# Patient Record
Sex: FEMALE | Race: WHITE | NOT HISPANIC OR LATINO
[De-identification: names, ages, dates, MRNs, and addresses within clinical notes are randomized per-mention and may not be internally consistent; named-entity substitution may affect disease eponyms.]

---

## 2018-10-09 ENCOUNTER — RX RENEWAL (OUTPATIENT)
Age: 58
End: 2018-10-09

## 2018-11-09 ENCOUNTER — APPOINTMENT (OUTPATIENT)
Dept: ENDOCRINOLOGY | Facility: CLINIC | Age: 58
End: 2018-11-09
Payer: COMMERCIAL

## 2018-11-09 ENCOUNTER — RECORD ABSTRACTING (OUTPATIENT)
Age: 58
End: 2018-11-09

## 2018-11-09 VITALS
WEIGHT: 144 LBS | DIASTOLIC BLOOD PRESSURE: 70 MMHG | HEIGHT: 61.5 IN | BODY MASS INDEX: 26.84 KG/M2 | SYSTOLIC BLOOD PRESSURE: 110 MMHG | HEART RATE: 76 BPM

## 2018-11-09 DIAGNOSIS — F17.200 NICOTINE DEPENDENCE, UNSPECIFIED, UNCOMPLICATED: ICD-10-CM

## 2018-11-09 DIAGNOSIS — Z86.39 PERSONAL HISTORY OF OTHER ENDOCRINE, NUTRITIONAL AND METABOLIC DISEASE: ICD-10-CM

## 2018-11-09 DIAGNOSIS — Z78.9 OTHER SPECIFIED HEALTH STATUS: ICD-10-CM

## 2018-11-09 DIAGNOSIS — E78.5 HYPERLIPIDEMIA, UNSPECIFIED: ICD-10-CM

## 2018-11-09 DIAGNOSIS — E53.8 DEFICIENCY OF OTHER SPECIFIED B GROUP VITAMINS: ICD-10-CM

## 2018-11-09 PROCEDURE — 99213 OFFICE O/P EST LOW 20 MIN: CPT

## 2018-11-14 ENCOUNTER — RX CHANGE (OUTPATIENT)
Age: 58
End: 2018-11-14

## 2019-01-04 ENCOUNTER — MEDICATION RENEWAL (OUTPATIENT)
Age: 59
End: 2019-01-04

## 2019-05-17 ENCOUNTER — APPOINTMENT (OUTPATIENT)
Dept: ENDOCRINOLOGY | Facility: CLINIC | Age: 59
End: 2019-05-17
Payer: COMMERCIAL

## 2019-05-17 VITALS
DIASTOLIC BLOOD PRESSURE: 68 MMHG | HEART RATE: 73 BPM | WEIGHT: 141 LBS | SYSTOLIC BLOOD PRESSURE: 118 MMHG | BODY MASS INDEX: 26.28 KG/M2 | HEIGHT: 61.5 IN

## 2019-05-17 PROCEDURE — 99213 OFFICE O/P EST LOW 20 MIN: CPT

## 2019-05-20 NOTE — HISTORY OF PRESENT ILLNESS
[FreeTextEntry1] : Pt here for follow up Hypothryiodism PreD HTN high chol\par has been doing wellon med\par feels well\par  knows needs to get back to watching diet

## 2019-05-20 NOTE — PHYSICAL EXAM
[Alert] : alert [Well Nourished] : well nourished [No Acute Distress] : no acute distress [EOMI] : extra ocular movement intact [Normal Sclera/Conjunctiva] : normal sclera/conjunctiva [No Proptosis] : no proptosis [Well Developed] : well developed [No Thyroid Nodules] : there were no palpable thyroid nodules [Normal Oropharynx] : the oropharynx was normal [No Accessory Muscle Use] : no accessory muscle use [No Respiratory Distress] : no respiratory distress [Clear to Auscultation] : lungs were clear to auscultation bilaterally [Regular Rhythm] : with a regular rhythm [Normal Rate] : heart rate was normal  [Normal S1, S2] : normal S1 and S2 [Post Cervical Nodes] : posterior cervical nodes [No Edema] : there was no peripheral edema [Axillary Nodes] : axillary nodes [No Spinal Tenderness] : no spinal tenderness [Normal] : normal and non tender [Anterior Cervical Nodes] : anterior cervical nodes [Spine Straight] : spine straight [No Stigmata of Cushings Syndrome] : no stigmata of cushings syndrome [No Rash] : no rash [Normal Gait] : normal gait [Normal Strength/Tone] : muscle strength and tone were normal [Normal Reflexes] : deep tendon reflexes were 2+ and symmetric [No Tremors] : no tremors [Acanthosis Nigricans] : no acanthosis nigricans [Oriented x3] : oriented to person, place, and time [de-identified] : mild enlarged thryoid

## 2019-05-20 NOTE — ASSESSMENT
[FreeTextEntry1] : Hypothryoid will keep at Synthroid generic for now TFT in Nov were ok \par if off again willswitch to generic \par Low B12 cont otc \par low Vit Do cont OTC \par IFG - watch diet \par high chol- cont  zocor 20 mg qhs \par \par

## 2019-06-11 ENCOUNTER — RX RENEWAL (OUTPATIENT)
Age: 59
End: 2019-06-11

## 2019-07-09 ENCOUNTER — RX RENEWAL (OUTPATIENT)
Age: 59
End: 2019-07-09

## 2019-11-18 ENCOUNTER — APPOINTMENT (OUTPATIENT)
Dept: ENDOCRINOLOGY | Facility: CLINIC | Age: 59
End: 2019-11-18

## 2019-12-11 ENCOUNTER — RX RENEWAL (OUTPATIENT)
Age: 59
End: 2019-12-11

## 2020-01-03 ENCOUNTER — APPOINTMENT (OUTPATIENT)
Dept: ENDOCRINOLOGY | Facility: CLINIC | Age: 60
End: 2020-01-03
Payer: COMMERCIAL

## 2020-01-03 VITALS
HEIGHT: 61.5 IN | SYSTOLIC BLOOD PRESSURE: 110 MMHG | WEIGHT: 138 LBS | BODY MASS INDEX: 25.72 KG/M2 | OXYGEN SATURATION: 98 % | DIASTOLIC BLOOD PRESSURE: 70 MMHG | HEART RATE: 84 BPM

## 2020-01-03 PROCEDURE — 99213 OFFICE O/P EST LOW 20 MIN: CPT

## 2020-01-03 NOTE — PHYSICAL EXAM
[No Acute Distress] : no acute distress [Alert] : alert [Well Nourished] : well nourished [Well Developed] : well developed [Normal Sclera/Conjunctiva] : normal sclera/conjunctiva [EOMI] : extra ocular movement intact [No Thyroid Nodules] : there were no palpable thyroid nodules [No Respiratory Distress] : no respiratory distress [No Accessory Muscle Use] : no accessory muscle use [Clear to Auscultation] : lungs were clear to auscultation bilaterally [Normal Rate] : heart rate was normal  [Normal S1, S2] : normal S1 and S2 [No Edema] : there was no peripheral edema [Regular Rhythm] : with a regular rhythm [Post Cervical Nodes] : posterior cervical nodes [Anterior Cervical Nodes] : anterior cervical nodes [Normal] : normal and non tender [Spine Straight] : spine straight [No Stigmata of Cushings Syndrome] : no stigmata of cushings syndrome [Normal Gait] : normal gait [Normal Strength/Tone] : muscle strength and tone were normal [No Rash] : no rash [No Tremors] : no tremors [Normal Reflexes] : deep tendon reflexes were 2+ and symmetric [Oriented x3] : oriented to person, place, and time [Acanthosis Nigricans] : no acanthosis nigricans [de-identified] :  + red throat   [de-identified] : mild enlarged thryoid

## 2020-01-03 NOTE — HISTORY OF PRESENT ILLNESS
[FreeTextEntry1] : Pt here for follow up Hypothryiodism PreD HTN high chol\par \par woke up today with sore throat and nasal congestion \par \par oherwise has been doing ok

## 2020-01-03 NOTE — ASSESSMENT
[FreeTextEntry1] : Hypothryoid will keep at Synthroid generic-   but take 1 tab mon through sat  1/2 tab on sunday \par \par Low B12 cont otc \par low Vit Do cont OTC \par IFG - watch diet \par high chol- cont  zocor 20 mg qhs  low HSL- try more omega 3 foods - or can try omega 3 OTC 1 capsule per day  available OTC \par \par red throat- see PMD or walk in for strep test

## 2020-06-12 ENCOUNTER — RX RENEWAL (OUTPATIENT)
Age: 60
End: 2020-06-12

## 2020-07-03 ENCOUNTER — RX RENEWAL (OUTPATIENT)
Age: 60
End: 2020-07-03

## 2020-07-15 ENCOUNTER — APPOINTMENT (OUTPATIENT)
Dept: ENDOCRINOLOGY | Facility: CLINIC | Age: 60
End: 2020-07-15
Payer: COMMERCIAL

## 2020-07-15 VITALS
DIASTOLIC BLOOD PRESSURE: 60 MMHG | HEIGHT: 61 IN | SYSTOLIC BLOOD PRESSURE: 112 MMHG | BODY MASS INDEX: 26.06 KG/M2 | WEIGHT: 138 LBS

## 2020-07-15 VITALS — TEMPERATURE: 98.2 F

## 2020-07-15 DIAGNOSIS — E04.2 NONTOXIC MULTINODULAR GOITER: ICD-10-CM

## 2020-07-15 DIAGNOSIS — R73.01 IMPAIRED FASTING GLUCOSE: ICD-10-CM

## 2020-07-15 DIAGNOSIS — E06.3 AUTOIMMUNE THYROIDITIS: ICD-10-CM

## 2020-07-15 DIAGNOSIS — E03.9 HYPOTHYROIDISM, UNSPECIFIED: ICD-10-CM

## 2020-07-15 PROCEDURE — 99213 OFFICE O/P EST LOW 20 MIN: CPT

## 2020-07-15 RX ORDER — LEVOTHYROXINE SODIUM 0.1 MG/1
100 TABLET ORAL DAILY
Qty: 30 | Refills: 1 | Status: DISCONTINUED | OUTPATIENT
Start: 2018-11-27 | End: 2020-07-15

## 2020-07-19 PROBLEM — R73.01 IMPAIRED FASTING GLUCOSE: Status: ACTIVE | Noted: 2018-11-09

## 2020-07-19 PROBLEM — E06.3 AUTOIMMUNE THYROIDITIS: Status: ACTIVE | Noted: 2018-11-09

## 2020-07-19 PROBLEM — E03.9 HYPOTHYROID: Status: ACTIVE | Noted: 2018-10-02

## 2020-07-19 PROBLEM — E04.2 NONTOXIC MULTINODULAR GOITER: Status: ACTIVE | Noted: 2018-11-09

## 2020-07-19 NOTE — ASSESSMENT
[FreeTextEntry1] : Hypothryoid will keep at Synthroid generic-   but take 1 tab mon through sat  skip sunday \par check labs in 6 weeks \par  hematuria  Abn l UA - to see urology \par Low B12 cont otc \par low Vit Do cont OTC \par IFG - watch diet \par high chol- cont  zocor 20 mg qhs  low HDL- try more omega 3 foods - or can try omega 3 OTC 1 capsule per day  available OTC \par

## 2020-07-19 NOTE — HISTORY OF PRESENT ILLNESS
[FreeTextEntry1] : Pt here for follow up Hypothryiodism PreD HTN high chol\par \par has been on LT4 0.100  mg 6/7 days past 2 weeks\par   made appt to see Urology  \par voerall feeling well

## 2020-07-19 NOTE — PHYSICAL EXAM
[Alert] : alert [No Acute Distress] : no acute distress [Well Nourished] : well nourished [Normal Sclera/Conjunctiva] : normal sclera/conjunctiva [Well Developed] : well developed [EOMI] : extra ocular movement intact [No Proptosis] : no proptosis [Thyroid Not Enlarged] : the thyroid was not enlarged [No Thyroid Nodules] : no palpable thyroid nodules [No Respiratory Distress] : no respiratory distress [No Accessory Muscle Use] : no accessory muscle use [Normal S1, S2] : normal S1 and S2 [Normal Rate] : heart rate was normal [Clear to Auscultation] : lungs were clear to auscultation bilaterally [No Edema] : no peripheral edema [Pedal Pulses Normal] : the pedal pulses are present [Regular Rhythm] : with a regular rhythm [Normal Posterior Cervical Nodes] : no posterior cervical lymphadenopathy [No Spinal Tenderness] : no spinal tenderness [Normal Anterior Cervical Nodes] : no anterior cervical lymphadenopathy [No Stigmata of Cushings Syndrome] : no stigmata of Cushings Syndrome [Spine Straight] : spine straight [Normal Gait] : normal gait [Normal Strength/Tone] : muscle strength and tone were normal [Normal Reflexes] : deep tendon reflexes were 2+ and symmetric [No Rash] : no rash [Acanthosis Nigricans] : no acanthosis nigricans [No Tremors] : no tremors [Oriented x3] : oriented to person, place, and time

## 2020-11-18 ENCOUNTER — LABORATORY RESULT (OUTPATIENT)
Age: 60
End: 2020-11-18

## 2020-11-18 ENCOUNTER — APPOINTMENT (OUTPATIENT)
Dept: RHEUMATOLOGY | Facility: CLINIC | Age: 60
End: 2020-11-18
Payer: COMMERCIAL

## 2020-11-18 VITALS
BODY MASS INDEX: 25.86 KG/M2 | WEIGHT: 137 LBS | HEART RATE: 80 BPM | DIASTOLIC BLOOD PRESSURE: 76 MMHG | TEMPERATURE: 97.9 F | HEIGHT: 61 IN | OXYGEN SATURATION: 95 % | RESPIRATION RATE: 17 BRPM | SYSTOLIC BLOOD PRESSURE: 102 MMHG

## 2020-11-18 DIAGNOSIS — Z82.62 FAMILY HISTORY OF OSTEOPOROSIS: ICD-10-CM

## 2020-11-18 DIAGNOSIS — F17.290 NICOTINE DEPENDENCE, OTHER TOBACCO PRODUCT, UNCOMPLICATED: ICD-10-CM

## 2020-11-18 PROCEDURE — 99203 OFFICE O/P NEW LOW 30 MIN: CPT

## 2020-11-18 RX ORDER — CHOLECALCIFEROL (VITAMIN D3) 25 MCG
25 MCG TABLET ORAL
Qty: 100 | Refills: 0 | Status: DISCONTINUED | COMMUNITY
Start: 2018-11-09 | End: 2020-11-18

## 2020-11-18 RX ORDER — VITAMIN K2 90 MCG
125 MCG CAPSULE ORAL
Refills: 0 | Status: ACTIVE | COMMUNITY

## 2020-11-18 RX ORDER — CALCIUM CARBONATE/VITAMIN D3 500 MG-2.5
500-100 TABLET,CHEWABLE ORAL
Qty: 90 | Refills: 0 | Status: DISCONTINUED | COMMUNITY
Start: 2018-11-09 | End: 2020-11-18

## 2020-11-18 RX ORDER — CALCIUM CARBONATE/VITAMIN D3 600 MG-20
TABLET ORAL
Refills: 0 | Status: ACTIVE | COMMUNITY

## 2020-11-18 NOTE — PHYSICAL EXAM
[General Appearance - Alert] : alert [General Appearance - In No Acute Distress] : in no acute distress [General Appearance - Well Nourished] : well nourished [General Appearance - Well Developed] : well developed [Sclera] : the sclera and conjunctiva were normal [PERRL With Normal Accommodation] : pupils were equal in size, round, and reactive to light [Extraocular Movements] : extraocular movements were intact [Outer Ear] : the ears and nose were normal in appearance [Examination Of The Oral Cavity] : the lips and gums were normal [Oropharynx] : the oropharynx was normal [Neck Appearance] : the appearance of the neck was normal [Jugular Venous Distention Increased] : there was no jugular-venous distention [Auscultation Breath Sounds / Voice Sounds] : lungs were clear to auscultation bilaterally [Heart Rate And Rhythm] : heart rate was normal and rhythm regular [Heart Sounds] : normal S1 and S2 [Heart Sounds Gallop] : no gallops [Murmurs] : no murmurs [Heart Sounds Pericardial Friction Rub] : no pericardial rub [No CVA Tenderness] : no ~M costovertebral angle tenderness [No Spinal Tenderness] : no spinal tenderness [Abnormal Walk] : normal gait [Nail Clubbing] : no clubbing  or cyanosis of the fingernails [Involuntary Movements] : no involuntary movements were seen [Musculoskeletal - Swelling] : no joint swelling seen [Motor Tone] : muscle strength and tone were normal [] : no rash [Skin Lesions] : no skin lesions [Sensation] : the sensory exam was normal to light touch and pinprick [Motor Exam] : the motor exam was normal [No Focal Deficits] : no focal deficits [Oriented To Time, Place, And Person] : oriented to person, place, and time [Impaired Insight] : insight and judgment were intact [Affect] : the affect was normal [Mood] : the mood was normal

## 2020-11-18 NOTE — ASSESSMENT
[FreeTextEntry1] : OP, postmenopausal\par \par - labs as below\par - review DEXA- done at [facility]\par - calcium and vitamin d supplementation. \par - regular exercise: aerobic and resistance\par - fall prevention\par - smoking cessation\par - limitation of alcohol and caffeine intake\par - will plan to start therapy with oral bisphosphate after reviewing results\par Discussed with the patient risks and benefits of bisphosphonates including, but not limited to, loss of appetite atypical bone fractures, gastritis, hypocalcemia, esophagitis, gastrointestinal strictures, bone and muscle pain, TM/jaw osteonecrosis (bone necrosis)\par \par Discussed treatment plan with the patient. The patient was given the opportunity to ask questions and all questions were answered to their satisfaction.

## 2020-11-18 NOTE — HISTORY OF PRESENT ILLNESS
[FreeTextEntry1] : 60 year old female with PMH as listed below presents today for an initial evaluation for OP\par \par Last DEXA 10/2020 with OP ( Not available to review today). Currently on calcium/vitamin d supplementation.  denies previous treatment. denies previous fx. denies parental hip fractures. denies estrogen use. denies steroid use. denies GERD. denies hx of inflammatory arthritides. 1 recent fall last week- tripped over box. No fx. denies any upcoming or planned jaw surgery ,extractions, etc. +cigarette. +occasional alcohol use.

## 2020-12-02 ENCOUNTER — APPOINTMENT (OUTPATIENT)
Dept: RHEUMATOLOGY | Facility: CLINIC | Age: 60
End: 2020-12-02
Payer: COMMERCIAL

## 2020-12-02 VITALS
WEIGHT: 140 LBS | TEMPERATURE: 97.9 F | BODY MASS INDEX: 26.43 KG/M2 | HEIGHT: 61 IN | RESPIRATION RATE: 17 BRPM | OXYGEN SATURATION: 96 % | SYSTOLIC BLOOD PRESSURE: 124 MMHG | HEART RATE: 83 BPM | DIASTOLIC BLOOD PRESSURE: 64 MMHG

## 2020-12-02 LAB
25(OH)D3 SERPL-MCNC: 54.1 NG/ML
ALBUMIN MFR SERPL ELPH: 59.7 %
ALBUMIN SERPL ELPH-MCNC: 4.4 G/DL
ALBUMIN SERPL-MCNC: 4 G/DL
ALBUMIN/GLOB SERPL: 1.5 RATIO
ALBUPE: 17.5 %
ALP BLD-CCNC: 84 U/L
ALPHA1 GLOB MFR SERPL ELPH: 4.3 %
ALPHA1 GLOB SERPL ELPH-MCNC: 0.3 G/DL
ALPHA1UPE: 33.8 %
ALPHA2 GLOB MFR SERPL ELPH: 12.8 %
ALPHA2 GLOB SERPL ELPH-MCNC: 0.9 G/DL
ALPHA2UPE: 22.4 %
ALT SERPL-CCNC: 8 U/L
ANION GAP SERPL CALC-SCNC: 10 MMOL/L
AST SERPL-CCNC: 16 U/L
B-GLOBULIN MFR SERPL ELPH: 12.5 %
B-GLOBULIN SERPL ELPH-MCNC: 0.8 G/DL
BASOPHILS # BLD AUTO: 0.08 K/UL
BASOPHILS NFR BLD AUTO: 1 %
BETAUPE: 14.5 %
BILIRUB SERPL-MCNC: 0.5 MG/DL
BUN SERPL-MCNC: 16 MG/DL
CALCIUM SERPL-MCNC: 9.6 MG/DL
CALCIUM SERPL-MCNC: 9.6 MG/DL
CHLORIDE SERPL-SCNC: 104 MMOL/L
CK SERPL-CCNC: 76 U/L
CO2 SERPL-SCNC: 27 MMOL/L
COLLAGEN CTX SERPL-MCNC: 266 PG/ML
CREAT 24H UR-MCNC: NORMAL G/24 H
CREAT SERPL-MCNC: 0.8 MG/DL
CREAT SPEC-SCNC: 112 MG/DL
CREAT/PROT UR: 0.1 RATIO
CREATININE UR (MAYO): 112 MG/DL
CRP SERPL-MCNC: 0.16 MG/DL
EOSINOPHIL # BLD AUTO: 0.15 K/UL
EOSINOPHIL NFR BLD AUTO: 1.9 %
ERYTHROCYTE [SEDIMENTATION RATE] IN BLOOD BY WESTERGREN METHOD: 25 MM/HR
GAMMA GLOB FLD ELPH-MCNC: 0.7 G/DL
GAMMA GLOB MFR SERPL ELPH: 10.7 %
GAMMAUPE: 11.8 %
GLUCOSE SERPL-MCNC: 95 MG/DL
HCT VFR BLD CALC: 42.3 %
HGB BLD-MCNC: 13.1 G/DL
IGA 24H UR QL IFE: NORMAL
IMM GRANULOCYTES NFR BLD AUTO: 0.3 %
INTERPRETATION SERPL IEP-IMP: NORMAL
KAPPA LC 24H UR QL: NORMAL
LYMPHOCYTES # BLD AUTO: 1.68 K/UL
LYMPHOCYTES NFR BLD AUTO: 21.2 %
M PROTEIN SPEC IFE-MCNC: NORMAL
MAGNESIUM SERPL-MCNC: 2.1 MG/DL
MAN DIFF?: NORMAL
MCHC RBC-ENTMCNC: 28.9 PG
MCHC RBC-ENTMCNC: 31 GM/DL
MCV RBC AUTO: 93.2 FL
MONOCYTES # BLD AUTO: 0.57 K/UL
MONOCYTES NFR BLD AUTO: 7.2 %
NEUTROPHILS # BLD AUTO: 5.41 K/UL
NEUTROPHILS NFR BLD AUTO: 68.4 %
PARATHYROID HORMONE INTACT: 38 PG/ML
PHOSPHATE SERPL-MCNC: 3.9 MG/DL
PLATELET # BLD AUTO: 231 K/UL
POTASSIUM SERPL-SCNC: 4.2 MMOL/L
PROT PATTERN 24H UR ELPH-IMP: NORMAL
PROT SERPL-MCNC: 6.7 G/DL
PROT UR-MCNC: 8 MG/DL
RBC # BLD: 4.54 M/UL
RBC # FLD: 13.3 %
SODIUM SERPL-SCNC: 141 MMOL/L
SPECIMEN VOL 24H UR: NORMAL ML
TSH SERPL-ACNC: 0.38 UIU/ML
WBC # FLD AUTO: 7.91 K/UL

## 2020-12-02 PROCEDURE — 99214 OFFICE O/P EST MOD 30 MIN: CPT

## 2020-12-02 PROCEDURE — 99072 ADDL SUPL MATRL&STAF TM PHE: CPT

## 2020-12-02 NOTE — HISTORY OF PRESENT ILLNESS
[FreeTextEntry1] : Pt presenting today for a f.u visit. \par labs reviewed with pt- unremarkable. \par DEXA 10/2020 with OP.  AP Spine: -2.2. L femoral neck: -2.6, Left total: -2.0, R femoral neck: -2.4, Right total: -1.6. \par Denies recent falls, fx.

## 2020-12-02 NOTE — ASSESSMENT
[FreeTextEntry1] : OP, postmenopausal\par \par DEXA 10/2020 with OP.  AP Spine: -2.2. L femoral neck: -2.6, Left total: -2.0, R femoral neck: -2.4, Right total: -1.6. \par - calcium and vitamin d supplementation. \par - regular exercise: aerobic and resistance\par - fall prevention\par - smoking cessation\par - limitation of alcohol and caffeine intake\par - start alendronate 70 mg weekly\par Discussed with the patient risks and benefits of bisphosphonates including, but not limited to, loss of appetite atypical bone fractures, gastritis, hypocalcemia, esophagitis, gastrointestinal strictures, bone and muscle pain, TM/jaw osteonecrosis (bone necrosis)\par \par Discussed treatment plan with the patient. The patient was given the opportunity to ask questions and all questions were answered to their satisfaction.

## 2020-12-15 ENCOUNTER — RX RENEWAL (OUTPATIENT)
Age: 60
End: 2020-12-15

## 2020-12-16 ENCOUNTER — RX RENEWAL (OUTPATIENT)
Age: 60
End: 2020-12-16

## 2021-01-14 ENCOUNTER — APPOINTMENT (OUTPATIENT)
Dept: ENDOCRINOLOGY | Facility: CLINIC | Age: 61
End: 2021-01-14

## 2021-03-18 ENCOUNTER — APPOINTMENT (OUTPATIENT)
Dept: ENDOCRINOLOGY | Facility: CLINIC | Age: 61
End: 2021-03-18

## 2021-04-16 ENCOUNTER — RX RENEWAL (OUTPATIENT)
Age: 61
End: 2021-04-16

## 2021-06-02 ENCOUNTER — APPOINTMENT (OUTPATIENT)
Dept: RHEUMATOLOGY | Facility: CLINIC | Age: 61
End: 2021-06-02
Payer: COMMERCIAL

## 2021-06-02 DIAGNOSIS — Z00.00 ENCOUNTER FOR GENERAL ADULT MEDICAL EXAMINATION W/OUT ABNORMAL FINDINGS: ICD-10-CM

## 2021-06-02 PROCEDURE — 99072 ADDL SUPL MATRL&STAF TM PHE: CPT

## 2021-06-02 PROCEDURE — 99213 OFFICE O/P EST LOW 20 MIN: CPT

## 2021-06-02 NOTE — HISTORY OF PRESENT ILLNESS
[FreeTextEntry1] : Pt presenting today for a f.u visit. \par Doing well on alendronate. Tolerating medication well. Denies side effects. \par Denies recent falls, fx. \par Denies fever, chills, CP, SOB, abd pain, rashes, joint pain/swelling. No current complaints

## 2021-06-02 NOTE — ASSESSMENT
[FreeTextEntry1] : OP, postmenopausal\par \par DEXA 10/2020 with OP.  AP Spine: -2.2. L femoral neck: -2.6, Left total: -2.0, R femoral neck: -2.4, Right total: -1.6.\par \par - repeat labs \par - calcium and vitamin d supplementation\par - regular exercise: aerobic and resistance\par - fall prevention\par - smoking cessation\par - limitation of alcohol and caffeine intake\par - c/w alendronate 70 mg weekly\par Discussed with the patient risks and benefits of bisphosphonates including, but not limited to, loss of appetite atypical bone fractures, gastritis, hypocalcemia, esophagitis, gastrointestinal strictures, bone and muscle pain, TM/jaw osteonecrosis (bone necrosis)\par \par Discussed treatment plan with the patient. The patient was given the opportunity to ask questions and all questions were answered to their satisfaction.

## 2021-06-15 ENCOUNTER — RX RENEWAL (OUTPATIENT)
Age: 61
End: 2021-06-15

## 2021-09-23 ENCOUNTER — RX RENEWAL (OUTPATIENT)
Age: 61
End: 2021-09-23

## 2021-10-20 ENCOUNTER — RX RENEWAL (OUTPATIENT)
Age: 61
End: 2021-10-20

## 2021-11-23 ENCOUNTER — APPOINTMENT (OUTPATIENT)
Dept: ENDOCRINOLOGY | Facility: CLINIC | Age: 61
End: 2021-11-23
Payer: COMMERCIAL

## 2021-11-23 VITALS
WEIGHT: 141 LBS | SYSTOLIC BLOOD PRESSURE: 114 MMHG | OXYGEN SATURATION: 97 % | HEIGHT: 61 IN | HEART RATE: 71 BPM | BODY MASS INDEX: 26.62 KG/M2 | DIASTOLIC BLOOD PRESSURE: 66 MMHG

## 2021-11-23 PROCEDURE — 99214 OFFICE O/P EST MOD 30 MIN: CPT

## 2021-11-28 NOTE — HISTORY OF PRESENT ILLNESS
[FreeTextEntry1] : Pt here for follow up Hypothryiodism PreD HTN high chol\par \par has been on LT4 0.088 mg qd \par \par seeing rheumatology \par

## 2021-11-28 NOTE — PHYSICAL EXAM
[Alert] : alert [Well Nourished] : well nourished [No Acute Distress] : no acute distress [Well Developed] : well developed [Normal Sclera/Conjunctiva] : normal sclera/conjunctiva [EOMI] : extra ocular movement intact [No Proptosis] : no proptosis [Thyroid Not Enlarged] : the thyroid was not enlarged [No Thyroid Nodules] : no palpable thyroid nodules [No Respiratory Distress] : no respiratory distress [No Accessory Muscle Use] : no accessory muscle use [Clear to Auscultation] : lungs were clear to auscultation bilaterally [Normal S1, S2] : normal S1 and S2 [Normal Rate] : heart rate was normal [Regular Rhythm] : with a regular rhythm [No Edema] : no peripheral edema [Pedal Pulses Normal] : the pedal pulses are present [Normal Anterior Cervical Nodes] : no anterior cervical lymphadenopathy [No Spinal Tenderness] : no spinal tenderness [Spine Straight] : spine straight [No Stigmata of Cushings Syndrome] : no stigmata of Cushings Syndrome [Normal Gait] : normal gait [Normal Strength/Tone] : muscle strength and tone were normal [No Rash] : no rash [Acanthosis Nigricans] : no acanthosis nigricans [Normal Reflexes] : deep tendon reflexes were 2+ and symmetric [No Tremors] : no tremors [Oriented x3] : oriented to person, place, and time

## 2021-11-28 NOTE — ASSESSMENT
[FreeTextEntry1] : Hypothryoid will keep same dose Syntrhoid and recheck labs \par  hematuria  Abn l UA - to see urology \par Low B12 cont otc \par low Vit Do cont OTC \par IFG - watch diet \par high chol- cont  zocor 20 mg qhs  low HDL- try more omega 3 foods - or can try omega 3 OTC 1 capsule per day  available OTC \par

## 2021-12-01 ENCOUNTER — APPOINTMENT (OUTPATIENT)
Dept: RHEUMATOLOGY | Facility: CLINIC | Age: 61
End: 2021-12-01
Payer: COMMERCIAL

## 2021-12-01 PROCEDURE — 99213 OFFICE O/P EST LOW 20 MIN: CPT

## 2021-12-01 NOTE — HISTORY OF PRESENT ILLNESS
[FreeTextEntry1] : Pt presenting today for a f.u visit. No current complaints. Recent labs from 11/2021 reviewed with pt. \par Doing well on alendronate. Tolerating medication well. Denies side effects. \par Denies fever, chills, CP, SOB, abd pain, rashes, joint pain/swelling.

## 2021-12-01 NOTE — ASSESSMENT
[FreeTextEntry1] : OP, postmenopausal\par \par DEXA 10/2020 with OP.  AP Spine: -2.2. L femoral neck: -2.6, Left total: -2.0, R femoral neck: -2.4, Right total: -1.6.\par \par - repeat DEXA\par - calcium and vitamin d supplementation\par - regular exercise: aerobic and resistance\par - fall prevention\par - smoking cessation\par - limitation of alcohol and caffeine intake\par - c/w alendronate 70 mg weekly\par Discussed with the patient risks and benefits of bisphosphonates including, but not limited to, loss of appetite atypical bone fractures, gastritis, hypocalcemia, esophagitis, gastrointestinal strictures, bone and muscle pain, TM/jaw osteonecrosis (bone necrosis)\par \par Discussed treatment plan with the patient. The patient was given the opportunity to ask questions and all questions were answered to their satisfaction.

## 2021-12-30 ENCOUNTER — RX RENEWAL (OUTPATIENT)
Age: 61
End: 2021-12-30

## 2022-05-02 ENCOUNTER — RX RENEWAL (OUTPATIENT)
Age: 62
End: 2022-05-02

## 2022-05-03 ENCOUNTER — APPOINTMENT (OUTPATIENT)
Dept: ENDOCRINOLOGY | Facility: CLINIC | Age: 62
End: 2022-05-03
Payer: COMMERCIAL

## 2022-05-03 VITALS
WEIGHT: 145 LBS | SYSTOLIC BLOOD PRESSURE: 118 MMHG | BODY MASS INDEX: 27.38 KG/M2 | HEART RATE: 78 BPM | DIASTOLIC BLOOD PRESSURE: 68 MMHG | HEIGHT: 61 IN

## 2022-05-03 PROCEDURE — 99214 OFFICE O/P EST MOD 30 MIN: CPT

## 2022-05-03 NOTE — HISTORY OF PRESENT ILLNESS
[FreeTextEntry1] : Pt here for follow up Hypothryiodism PreD HTN high chol\par \par has been on LT4 0.088 mg qd \par \par seeing rheumatology \par \par leg is healign up from injury with skin  loss

## 2022-05-03 NOTE — PHYSICAL EXAM
[Alert] : alert [Well Nourished] : well nourished [No Acute Distress] : no acute distress [Well Developed] : well developed [Normal Sclera/Conjunctiva] : normal sclera/conjunctiva [EOMI] : extra ocular movement intact [No Proptosis] : no proptosis [Thyroid Not Enlarged] : the thyroid was not enlarged [No Thyroid Nodules] : no palpable thyroid nodules [No Respiratory Distress] : no respiratory distress [No Accessory Muscle Use] : no accessory muscle use [Clear to Auscultation] : lungs were clear to auscultation bilaterally [Normal S1, S2] : normal S1 and S2 [Normal Rate] : heart rate was normal [Regular Rhythm] : with a regular rhythm [No Edema] : no peripheral edema [Pedal Pulses Normal] : the pedal pulses are present [Normal Anterior Cervical Nodes] : no anterior cervical lymphadenopathy [No Spinal Tenderness] : no spinal tenderness [Spine Straight] : spine straight [No Stigmata of Cushings Syndrome] : no stigmata of Cushings Syndrome [Normal Gait] : normal gait [Normal Strength/Tone] : muscle strength and tone were normal [No Rash] : no rash [Normal Reflexes] : deep tendon reflexes were 2+ and symmetric [No Tremors] : no tremors [Oriented x3] : oriented to person, place, and time [Acanthosis Nigricans] : no acanthosis nigricans

## 2022-05-03 NOTE — ASSESSMENT
[FreeTextEntry1] : Hypothryoid will keep same dose Syntrhoid and recheck labs \par  hematuria  Abn l UA - to see urology \par Low B12 cont otc \par low Vit Do cont OTC \par IFG - watch diet \par high chol- cont  zocor 20 mg qhs \par  low HDL- try more omega 3 foods - or can try omega 3 OTC 1 capsule per day  available OTC \par leg wound  healing up

## 2022-06-10 ENCOUNTER — APPOINTMENT (OUTPATIENT)
Dept: RHEUMATOLOGY | Facility: CLINIC | Age: 62
End: 2022-06-10
Payer: COMMERCIAL

## 2022-06-10 VITALS
SYSTOLIC BLOOD PRESSURE: 120 MMHG | HEART RATE: 78 BPM | TEMPERATURE: 97.8 F | BODY MASS INDEX: 27.38 KG/M2 | OXYGEN SATURATION: 97 % | RESPIRATION RATE: 17 BRPM | HEIGHT: 61 IN | DIASTOLIC BLOOD PRESSURE: 80 MMHG | WEIGHT: 145 LBS

## 2022-06-10 DIAGNOSIS — E55.9 VITAMIN D DEFICIENCY, UNSPECIFIED: ICD-10-CM

## 2022-06-10 PROCEDURE — 99213 OFFICE O/P EST LOW 20 MIN: CPT

## 2022-06-10 RX ORDER — ALENDRONATE SODIUM 70 MG/1
70 TABLET ORAL
Qty: 5 | Refills: 6 | Status: ACTIVE | COMMUNITY
Start: 2020-12-02 | End: 1900-01-01

## 2022-06-10 NOTE — ASSESSMENT
[FreeTextEntry1] : OP, postmenopausal\par \par DEXA 10/2021 with OP.  AP Spine: -2.2. L femoral neck: -2.6, Left total: -2.0, R femoral neck: -2.4, Right total: -1.6.\par No changed from 2014 DEXA. \par \par - calcium and vitamin d supplementation\par - regular exercise: aerobic and resistance\par - fall prevention\par - smoking cessation\par - limitation of alcohol and caffeine intake\par - on alendronate 70 mg weekly since 12/2020\par Discussed with the patient risks and benefits of bisphosphonates including, but not limited to, loss of appetite atypical bone fractures, gastritis, hypocalcemia, esophagitis, gastrointestinal strictures, bone and muscle pain, TM/jaw osteonecrosis (bone necrosis)\par - will repeat DEXA 10/2022 to see if there is any improvement\par \par Discussed treatment plan with the patient. The patient was given the opportunity to ask questions and all questions were answered to their satisfaction.

## 2022-06-10 NOTE — HISTORY OF PRESENT ILLNESS
[FreeTextEntry1] : Pt presenting today for a f.u visit. No current complaints. Recent labs from 05/2022 reviewed with pt. Doing well on alendronate. Tolerating medication well. Denies side effects. \par Denies fever, chills, CP, SOB, abd pain, rashes, joint pain/swelling, falls, new fxs.

## 2022-11-10 LAB — TSH SERPL-ACNC: 3.06

## 2022-11-11 ENCOUNTER — APPOINTMENT (OUTPATIENT)
Dept: ENDOCRINOLOGY | Facility: CLINIC | Age: 62
End: 2022-11-11

## 2022-11-11 VITALS
BODY MASS INDEX: 26.62 KG/M2 | SYSTOLIC BLOOD PRESSURE: 110 MMHG | OXYGEN SATURATION: 99 % | HEART RATE: 76 BPM | DIASTOLIC BLOOD PRESSURE: 62 MMHG | HEIGHT: 61 IN | WEIGHT: 141 LBS

## 2022-11-11 PROCEDURE — 99214 OFFICE O/P EST MOD 30 MIN: CPT

## 2022-11-11 NOTE — ASSESSMENT
[FreeTextEntry1] : Hypothryoid will keep same dose Syntrhoid and recheck labs \par  hematuria  Abnl  UA -stable \par Low B12 cont otc \par low Vit D now 95 but will hold x 3 weeks \par and call with results \par \par IFG - watch diet \par \par high chol- cont  zocor 20 mg qhs \par \par low HDL  doing better \par osteoporosis on alendronate

## 2022-11-11 NOTE — HISTORY OF PRESENT ILLNESS
[FreeTextEntry1] : Pt here for follow up Hypothryiodism PreD HTN high chol\par \par has been on LT4 0.088 mg qd \par \par seeing rheumatology \par \par

## 2022-12-07 ENCOUNTER — APPOINTMENT (OUTPATIENT)
Dept: RHEUMATOLOGY | Facility: CLINIC | Age: 62
End: 2022-12-07

## 2022-12-07 VITALS
DIASTOLIC BLOOD PRESSURE: 84 MMHG | OXYGEN SATURATION: 97 % | HEART RATE: 87 BPM | HEIGHT: 61 IN | SYSTOLIC BLOOD PRESSURE: 124 MMHG | TEMPERATURE: 98 F | RESPIRATION RATE: 17 BRPM

## 2022-12-07 PROCEDURE — 99214 OFFICE O/P EST MOD 30 MIN: CPT

## 2022-12-08 NOTE — ASSESSMENT
[FreeTextEntry1] : OP, postmenopausal\par s/p alendronate for 2 years\par \par DEXA from 11/2022 reviewed with pt- osteopenia. \par \par - drug holiday\par - calcium and vitamin d supplementation\par - regular exercise: aerobic and resistance\par - fall prevention\par - smoking cessation\par - limitation of alcohol and caffeine intake\par \par Discussed treatment plan with the patient. The patient was given the opportunity to ask questions and all questions were answered to their satisfaction.

## 2022-12-08 NOTE — HISTORY OF PRESENT ILLNESS
[FreeTextEntry1] : Pt presenting today for a f.u visit for OP. \par DEXA from 11/2022 reviewed with pt in detail- osteopenia. \par Denies fever, chills, CP, SOB, abd pain, rashes, joint pain/swelling, falls, new fxs. \par No acute complaints today.

## 2023-05-09 ENCOUNTER — RX RENEWAL (OUTPATIENT)
Age: 63
End: 2023-05-09

## 2023-11-03 ENCOUNTER — RX RENEWAL (OUTPATIENT)
Age: 63
End: 2023-11-03

## 2023-11-30 ENCOUNTER — APPOINTMENT (OUTPATIENT)
Dept: ENDOCRINOLOGY | Facility: CLINIC | Age: 63
End: 2023-11-30
Payer: COMMERCIAL

## 2023-11-30 VITALS
WEIGHT: 139 LBS | HEART RATE: 101 BPM | HEIGHT: 61 IN | OXYGEN SATURATION: 96 % | SYSTOLIC BLOOD PRESSURE: 120 MMHG | BODY MASS INDEX: 26.24 KG/M2 | DIASTOLIC BLOOD PRESSURE: 82 MMHG

## 2023-11-30 LAB — TSH SERPL-ACNC: 0.61

## 2023-11-30 PROCEDURE — 99214 OFFICE O/P EST MOD 30 MIN: CPT | Mod: 95

## 2023-12-11 ENCOUNTER — APPOINTMENT (OUTPATIENT)
Dept: RHEUMATOLOGY | Facility: CLINIC | Age: 63
End: 2023-12-11
Payer: COMMERCIAL

## 2023-12-11 VITALS
SYSTOLIC BLOOD PRESSURE: 130 MMHG | HEART RATE: 81 BPM | OXYGEN SATURATION: 98 % | TEMPERATURE: 97.3 F | DIASTOLIC BLOOD PRESSURE: 70 MMHG

## 2023-12-11 DIAGNOSIS — M81.0 AGE-RELATED OSTEOPOROSIS W/OUT CURRENT PATHOLOGICAL FRACTURE: ICD-10-CM

## 2023-12-11 PROCEDURE — 99214 OFFICE O/P EST MOD 30 MIN: CPT

## 2023-12-17 NOTE — ASSESSMENT
[FreeTextEntry1] : Hypothryoid will keep same dose Syntrhoid and recheck labs   hematuria Abnl UA -stable  Low B12 cont otc  low Vit D now 95 but will hold x 3 weeks  and call with results    IFG - watch diet    high chol- cont zocor 20 mg qhs    low HDL doing better  osteoporosis on alendronate.

## 2024-03-09 ENCOUNTER — NON-APPOINTMENT (OUTPATIENT)
Age: 64
End: 2024-03-09

## 2024-04-29 ENCOUNTER — RX RENEWAL (OUTPATIENT)
Age: 64
End: 2024-04-29

## 2024-04-29 RX ORDER — LEVOTHYROXINE SODIUM 0.09 MG/1
88 TABLET ORAL
Qty: 90 | Refills: 1 | Status: ACTIVE | COMMUNITY
Start: 2019-07-09 | End: 1900-01-01

## 2024-04-29 RX ORDER — SIMVASTATIN 20 MG/1
20 TABLET, FILM COATED ORAL
Qty: 90 | Refills: 1 | Status: ACTIVE | COMMUNITY
Start: 2018-10-10 | End: 1900-01-01

## 2024-05-20 LAB
HBA1C MFR BLD HPLC: 5.9
LDLC SERPL DIRECT ASSAY-MCNC: 81
MICROALBUMIN/CREAT 24H UR-RTO: 22
TSH SERPL-ACNC: 0.6

## 2024-05-23 ENCOUNTER — APPOINTMENT (OUTPATIENT)
Dept: ENDOCRINOLOGY | Facility: CLINIC | Age: 64
End: 2024-05-23

## 2024-07-09 ENCOUNTER — APPOINTMENT (OUTPATIENT)
Dept: ENDOCRINOLOGY | Facility: CLINIC | Age: 64
End: 2024-07-09
Payer: COMMERCIAL

## 2024-07-09 VITALS
SYSTOLIC BLOOD PRESSURE: 120 MMHG | DIASTOLIC BLOOD PRESSURE: 80 MMHG | HEIGHT: 61 IN | BODY MASS INDEX: 25.68 KG/M2 | OXYGEN SATURATION: 95 % | HEART RATE: 82 BPM | WEIGHT: 136 LBS

## 2024-07-09 DIAGNOSIS — R73.01 IMPAIRED FASTING GLUCOSE: ICD-10-CM

## 2024-07-09 DIAGNOSIS — E55.9 VITAMIN D DEFICIENCY, UNSPECIFIED: ICD-10-CM

## 2024-07-09 DIAGNOSIS — M81.0 AGE-RELATED OSTEOPOROSIS W/OUT CURRENT PATHOLOGICAL FRACTURE: ICD-10-CM

## 2024-07-09 DIAGNOSIS — E78.5 HYPERLIPIDEMIA, UNSPECIFIED: ICD-10-CM

## 2024-07-09 DIAGNOSIS — E03.9 HYPOTHYROIDISM, UNSPECIFIED: ICD-10-CM

## 2024-07-09 PROCEDURE — 99214 OFFICE O/P EST MOD 30 MIN: CPT

## 2024-10-24 ENCOUNTER — RX RENEWAL (OUTPATIENT)
Age: 64
End: 2024-10-24

## 2024-12-09 ENCOUNTER — APPOINTMENT (OUTPATIENT)
Dept: RHEUMATOLOGY | Facility: CLINIC | Age: 64
End: 2024-12-09
Payer: COMMERCIAL

## 2024-12-09 VITALS
DIASTOLIC BLOOD PRESSURE: 80 MMHG | HEIGHT: 61 IN | TEMPERATURE: 97.2 F | SYSTOLIC BLOOD PRESSURE: 142 MMHG | OXYGEN SATURATION: 96 % | HEART RATE: 83 BPM

## 2024-12-09 DIAGNOSIS — M81.0 AGE-RELATED OSTEOPOROSIS W/OUT CURRENT PATHOLOGICAL FRACTURE: ICD-10-CM

## 2024-12-09 DIAGNOSIS — Z79.899 OTHER LONG TERM (CURRENT) DRUG THERAPY: ICD-10-CM

## 2024-12-09 PROCEDURE — 99214 OFFICE O/P EST MOD 30 MIN: CPT

## 2024-12-09 PROCEDURE — G2211 COMPLEX E/M VISIT ADD ON: CPT

## 2024-12-09 RX ORDER — ALENDRONATE SODIUM 70 MG/1
70 TABLET ORAL
Qty: 5 | Refills: 6 | Status: ACTIVE | COMMUNITY
Start: 2024-12-09 | End: 1900-01-01

## 2024-12-10 PROBLEM — Z79.899 HIGH RISK MEDICATION USE: Status: ACTIVE | Noted: 2024-12-10

## 2024-12-10 LAB
25(OH)D3 SERPL-MCNC: 25.6 NG/ML
ALBUMIN SERPL ELPH-MCNC: 4.4 G/DL
ALP BLD-CCNC: 74 U/L
ALT SERPL-CCNC: 9 U/L
ANION GAP SERPL CALC-SCNC: 13 MMOL/L
AST SERPL-CCNC: 18 U/L
BASOPHILS # BLD AUTO: 0.07 K/UL
BASOPHILS NFR BLD AUTO: 0.8 %
BILIRUB SERPL-MCNC: 0.4 MG/DL
BUN SERPL-MCNC: 21 MG/DL
CALCIUM SERPL-MCNC: 9.8 MG/DL
CALCIUM SERPL-MCNC: 9.8 MG/DL
CHLORIDE SERPL-SCNC: 104 MMOL/L
CO2 SERPL-SCNC: 26 MMOL/L
CREAT SERPL-MCNC: 0.81 MG/DL
EGFR: 80 ML/MIN/1.73M2
EOSINOPHIL # BLD AUTO: 0.19 K/UL
EOSINOPHIL NFR BLD AUTO: 2.3 %
GLUCOSE SERPL-MCNC: 100 MG/DL
HCT VFR BLD CALC: 40.1 %
HGB BLD-MCNC: 12.7 G/DL
IMM GRANULOCYTES NFR BLD AUTO: 0.2 %
LYMPHOCYTES # BLD AUTO: 2.67 K/UL
LYMPHOCYTES NFR BLD AUTO: 32.1 %
MAGNESIUM SERPL-MCNC: 2.1 MG/DL
MAN DIFF?: NORMAL
MCHC RBC-ENTMCNC: 29.3 PG
MCHC RBC-ENTMCNC: 31.7 G/DL
MCV RBC AUTO: 92.4 FL
MONOCYTES # BLD AUTO: 0.6 K/UL
MONOCYTES NFR BLD AUTO: 7.2 %
NEUTROPHILS # BLD AUTO: 4.77 K/UL
NEUTROPHILS NFR BLD AUTO: 57.4 %
PARATHYROID HORMONE INTACT: 33 PG/ML
PHOSPHATE SERPL-MCNC: 3.2 MG/DL
PLATELET # BLD AUTO: 233 K/UL
POTASSIUM SERPL-SCNC: 3.9 MMOL/L
PROT SERPL-MCNC: 6.6 G/DL
RBC # BLD: 4.34 M/UL
RBC # FLD: 13.2 %
SODIUM SERPL-SCNC: 144 MMOL/L
TSH SERPL-ACNC: 0.9 UIU/ML
WBC # FLD AUTO: 8.32 K/UL

## 2024-12-11 LAB
ALBUMIN MFR SERPL ELPH: 63.3 %
ALBUMIN SERPL-MCNC: 4.2 G/DL
ALBUMIN/GLOB SERPL: 1.8 RATIO
ALPHA1 GLOB MFR SERPL ELPH: 4 %
ALPHA1 GLOB SERPL ELPH-MCNC: 0.3 G/DL
ALPHA2 GLOB MFR SERPL ELPH: 11.3 %
ALPHA2 GLOB SERPL ELPH-MCNC: 0.7 G/DL
B-GLOBULIN MFR SERPL ELPH: 12 %
B-GLOBULIN SERPL ELPH-MCNC: 0.8 G/DL
GAMMA GLOB FLD ELPH-MCNC: 0.6 G/DL
GAMMA GLOB MFR SERPL ELPH: 9.4 %
INTERPRETATION SERPL IEP-IMP: NORMAL
M PROTEIN SPEC IFE-MCNC: NORMAL
PROT SERPL-MCNC: 6.6 G/DL

## 2024-12-16 LAB — COLLAGEN CTX SERPL-MCNC: 122 PG/ML

## 2025-01-13 LAB — TSH SERPL-ACNC: 4.17

## 2025-01-14 ENCOUNTER — APPOINTMENT (OUTPATIENT)
Dept: ENDOCRINOLOGY | Facility: CLINIC | Age: 65
End: 2025-01-14

## 2025-01-14 VITALS
OXYGEN SATURATION: 95 % | BODY MASS INDEX: 26.43 KG/M2 | DIASTOLIC BLOOD PRESSURE: 74 MMHG | HEIGHT: 61 IN | SYSTOLIC BLOOD PRESSURE: 120 MMHG | WEIGHT: 140 LBS | HEART RATE: 81 BPM

## 2025-01-14 DIAGNOSIS — E03.9 HYPOTHYROIDISM, UNSPECIFIED: ICD-10-CM

## 2025-01-14 DIAGNOSIS — R73.01 IMPAIRED FASTING GLUCOSE: ICD-10-CM

## 2025-01-14 DIAGNOSIS — E78.5 HYPERLIPIDEMIA, UNSPECIFIED: ICD-10-CM

## 2025-01-14 DIAGNOSIS — E04.2 NONTOXIC MULTINODULAR GOITER: ICD-10-CM

## 2025-01-14 PROCEDURE — 99214 OFFICE O/P EST MOD 30 MIN: CPT

## 2025-01-14 PROCEDURE — G2211 COMPLEX E/M VISIT ADD ON: CPT

## 2025-05-01 ENCOUNTER — NON-APPOINTMENT (OUTPATIENT)
Age: 65
End: 2025-05-01

## 2025-05-05 LAB — TSH SERPL-ACNC: 16.5

## 2025-05-06 ENCOUNTER — APPOINTMENT (OUTPATIENT)
Dept: ENDOCRINOLOGY | Facility: CLINIC | Age: 65
End: 2025-05-06
Payer: COMMERCIAL

## 2025-05-06 VITALS
DIASTOLIC BLOOD PRESSURE: 82 MMHG | OXYGEN SATURATION: 97 % | HEART RATE: 76 BPM | SYSTOLIC BLOOD PRESSURE: 120 MMHG | WEIGHT: 136 LBS | BODY MASS INDEX: 25.68 KG/M2 | HEIGHT: 61 IN

## 2025-05-06 DIAGNOSIS — E06.3 AUTOIMMUNE THYROIDITIS: ICD-10-CM

## 2025-05-06 DIAGNOSIS — M81.0 AGE-RELATED OSTEOPOROSIS W/OUT CURRENT PATHOLOGICAL FRACTURE: ICD-10-CM

## 2025-05-06 DIAGNOSIS — E03.9 HYPOTHYROIDISM, UNSPECIFIED: ICD-10-CM

## 2025-05-06 DIAGNOSIS — E78.5 HYPERLIPIDEMIA, UNSPECIFIED: ICD-10-CM

## 2025-05-06 PROCEDURE — 99214 OFFICE O/P EST MOD 30 MIN: CPT

## 2025-06-09 ENCOUNTER — APPOINTMENT (OUTPATIENT)
Dept: RHEUMATOLOGY | Facility: CLINIC | Age: 65
End: 2025-06-09
Payer: COMMERCIAL

## 2025-06-09 VITALS
SYSTOLIC BLOOD PRESSURE: 118 MMHG | HEART RATE: 60 BPM | DIASTOLIC BLOOD PRESSURE: 72 MMHG | HEIGHT: 61 IN | OXYGEN SATURATION: 98 %

## 2025-06-09 PROCEDURE — 99214 OFFICE O/P EST MOD 30 MIN: CPT

## 2025-06-09 PROCEDURE — G2211 COMPLEX E/M VISIT ADD ON: CPT

## 2025-06-09 RX ORDER — LEVOTHYROXINE SODIUM 100 UG/1
100 TABLET ORAL
Refills: 0 | Status: ACTIVE | COMMUNITY

## 2025-06-18 LAB
25(OH)D3 SERPL-MCNC: 28.9 NG/ML
ALBUMIN SERPL ELPH-MCNC: 4.2 G/DL
ALP BLD-CCNC: 70 U/L
ALT SERPL-CCNC: 16 U/L
ANION GAP SERPL CALC-SCNC: 15 MMOL/L
AST SERPL-CCNC: 20 U/L
BASOPHILS # BLD AUTO: 0.06 K/UL
BASOPHILS NFR BLD AUTO: 0.8 %
BILIRUB SERPL-MCNC: 0.5 MG/DL
BUN SERPL-MCNC: 15 MG/DL
CALCIUM SERPL-MCNC: 9.5 MG/DL
CALCIUM SERPL-MCNC: 9.5 MG/DL
CHLORIDE SERPL-SCNC: 102 MMOL/L
CO2 SERPL-SCNC: 25 MMOL/L
CREAT SERPL-MCNC: 0.87 MG/DL
EGFRCR SERPLBLD CKD-EPI 2021: 73 ML/MIN/1.73M2
EOSINOPHIL # BLD AUTO: 0.29 K/UL
EOSINOPHIL NFR BLD AUTO: 4.1 %
GLUCOSE SERPL-MCNC: 95 MG/DL
HCT VFR BLD CALC: 40.8 %
HGB BLD-MCNC: 13.1 G/DL
IMM GRANULOCYTES NFR BLD AUTO: 0.3 %
LYMPHOCYTES # BLD AUTO: 2.56 K/UL
LYMPHOCYTES NFR BLD AUTO: 35.9 %
MAGNESIUM SERPL-MCNC: 2 MG/DL
MAN DIFF?: NORMAL
MCHC RBC-ENTMCNC: 29.5 PG
MCHC RBC-ENTMCNC: 32.1 G/DL
MCV RBC AUTO: 91.9 FL
MONOCYTES # BLD AUTO: 0.63 K/UL
MONOCYTES NFR BLD AUTO: 8.8 %
NEUTROPHILS # BLD AUTO: 3.58 K/UL
NEUTROPHILS NFR BLD AUTO: 50.1 %
PARATHYROID HORMONE INTACT: 40 PG/ML
PHOSPHATE SERPL-MCNC: 4.2 MG/DL
PLATELET # BLD AUTO: 256 K/UL
POTASSIUM SERPL-SCNC: 4.1 MMOL/L
PROT SERPL-MCNC: 6.6 G/DL
RBC # BLD: 4.44 M/UL
RBC # FLD: 13.3 %
SODIUM SERPL-SCNC: 143 MMOL/L
TSH SERPL-ACNC: 0.95 UIU/ML
WBC # FLD AUTO: 7.14 K/UL

## 2025-06-24 ENCOUNTER — APPOINTMENT (OUTPATIENT)
Dept: ENDOCRINOLOGY | Facility: CLINIC | Age: 65
End: 2025-06-24
Payer: COMMERCIAL

## 2025-06-24 VITALS
SYSTOLIC BLOOD PRESSURE: 122 MMHG | HEART RATE: 86 BPM | BODY MASS INDEX: 26.43 KG/M2 | DIASTOLIC BLOOD PRESSURE: 70 MMHG | WEIGHT: 140 LBS | OXYGEN SATURATION: 95 % | HEIGHT: 61 IN

## 2025-06-24 PROCEDURE — G2211 COMPLEX E/M VISIT ADD ON: CPT

## 2025-06-24 PROCEDURE — 99214 OFFICE O/P EST MOD 30 MIN: CPT

## 2025-08-28 ENCOUNTER — LABORATORY RESULT (OUTPATIENT)
Age: 65
End: 2025-08-28

## 2025-09-11 ENCOUNTER — NON-APPOINTMENT (OUTPATIENT)
Age: 65
End: 2025-09-11

## 2025-09-16 ENCOUNTER — APPOINTMENT (OUTPATIENT)
Dept: ENDOCRINOLOGY | Facility: CLINIC | Age: 65
End: 2025-09-16
Payer: COMMERCIAL

## 2025-09-16 VITALS
DIASTOLIC BLOOD PRESSURE: 80 MMHG | OXYGEN SATURATION: 97 % | BODY MASS INDEX: 26.43 KG/M2 | HEIGHT: 61 IN | SYSTOLIC BLOOD PRESSURE: 112 MMHG | HEART RATE: 71 BPM | WEIGHT: 140 LBS

## 2025-09-16 DIAGNOSIS — E78.5 HYPERLIPIDEMIA, UNSPECIFIED: ICD-10-CM

## 2025-09-16 DIAGNOSIS — E03.9 HYPOTHYROIDISM, UNSPECIFIED: ICD-10-CM

## 2025-09-16 DIAGNOSIS — R73.01 IMPAIRED FASTING GLUCOSE: ICD-10-CM

## 2025-09-16 PROCEDURE — G2211 COMPLEX E/M VISIT ADD ON: CPT

## 2025-09-16 PROCEDURE — 99214 OFFICE O/P EST MOD 30 MIN: CPT
